# Patient Record
Sex: MALE | Race: WHITE | Employment: FULL TIME | URBAN - METROPOLITAN AREA
[De-identification: names, ages, dates, MRNs, and addresses within clinical notes are randomized per-mention and may not be internally consistent; named-entity substitution may affect disease eponyms.]

---

## 2019-10-07 LAB — HIV AG/AB: NORMAL

## 2021-08-01 ENCOUNTER — HOSPITAL ENCOUNTER (EMERGENCY)
Age: 36
Discharge: HOME | End: 2021-08-01
Attending: FAMILY MEDICINE
Payer: COMMERCIAL

## 2021-08-01 VITALS
WEIGHT: 202 LBS | TEMPERATURE: 98.3 F | SYSTOLIC BLOOD PRESSURE: 128 MMHG | BODY MASS INDEX: 26.77 KG/M2 | DIASTOLIC BLOOD PRESSURE: 80 MMHG | OXYGEN SATURATION: 98 % | HEIGHT: 73 IN | RESPIRATION RATE: 14 BRPM | HEART RATE: 75 BPM

## 2021-08-01 DIAGNOSIS — S46.812A TRAPEZIUS STRAIN, LEFT, INITIAL ENCOUNTER: Primary | ICD-10-CM

## 2021-08-01 PROCEDURE — 99283 EMERGENCY DEPT VISIT LOW MDM: CPT

## 2021-08-01 RX ORDER — IBUPROFEN 400 MG/1
400 TABLET ORAL EVERY 8 HOURS PRN
Qty: 12 TABLET | Refills: 0 | Status: SHIPPED | OUTPATIENT
Start: 2021-08-01

## 2021-08-01 RX ORDER — ACETAMINOPHEN 500 MG
1000 TABLET ORAL EVERY 8 HOURS PRN
Qty: 50 TABLET | Refills: 0 | Status: SHIPPED | OUTPATIENT
Start: 2021-08-01

## 2021-08-01 RX ORDER — CYCLOBENZAPRINE HCL 10 MG
10 TABLET ORAL 3 TIMES DAILY PRN
Qty: 12 TABLET | Refills: 0 | Status: SHIPPED | OUTPATIENT
Start: 2021-08-01 | End: 2021-08-11

## 2021-08-01 ASSESSMENT — PAIN DESCRIPTION - LOCATION: LOCATION: SHOULDER

## 2021-08-01 ASSESSMENT — PAIN DESCRIPTION - PAIN TYPE: TYPE: ACUTE PAIN

## 2021-08-01 ASSESSMENT — PAIN DESCRIPTION - ORIENTATION: ORIENTATION: LEFT

## 2021-08-01 NOTE — ED PROVIDER NOTES
[address] [facility]  Department of Emergency Medicine   ED  Encounter Note  Admit Date/RoomTime: 2021  3:30 PM  ED Room:     NAME: Gela Jurado  : 1985  MRN: [id number]     Chief Complaint:  Shoulder Injury (left shoulder , was digging with a shovel, occured 6 to 8 weeks ago)    History of Present Illness       Libby Flight Rande Peabody is a 30 y.o. old male who presents to the emergency department by private vehicle, for traumatic Left shoulder pain which occured 6 week(s) prior to arrival.  The complaint is due to shoveling. Patient has no prior history of pain/injury with regards to today's visit. He is right handed. The patients tetanus status is within past 5 years. Since onset the symptoms have been persistent. His pain is aggraveated by pressure on or palpation of painful area and relieved by rest of injured area. There is been some numbness down to his fingers no weakness not constant He denies any redness swelling. ROS   Pertinent positives and negatives are stated within HPI, all other systems reviewed and are negative. Past Medical History:  has no past medical history on file. Surgical History:  has a past surgical history that includes other surgical history (2017) and [place] tooth extraction. Social History:  reports that he quit smoking about 4 years ago. He does not have any smokeless tobacco history on file. He reports that he does not use drugs. Family History: family history includes Breast Cancer in his mother. Allergies: Patient has no known allergies. Physical Exam   Oxygen Saturation Interpretation: Normal.        ED Triage Vitals   BP Temp Temp Source Pulse Resp SpO2 Height Weight   21 1541 21 1541 21 1541 21 1541 21 1541 21 1541 21 1533 21 1533   128/80 98.3 °F (36.8 °C) Tympanic 75 14 98 % 6' 1\" (1.854 m) 202 lb (91.6 kg)         Constitutional:  Alert, development consistent with age.   Neck: ACETAMINOPHEN (TYLENOL) 500 MG TABLET    Take 2 tablets by mouth every 8 hours as needed for Pain    CYCLOBENZAPRINE (FLEXERIL) 10 MG TABLET    Take 1 tablet by mouth 3 times daily as needed for Muscle spasms Medication may cause drowsiness do not drive on this medication    IBUPROFEN (IBU) 400 MG TABLET    Take 1 tablet by mouth every 8 hours as needed for Pain Take with full glass of water     Electronically signed by Zechariah Hawley MD   DD: 8/1/21  **This report was transcribed using voice recognition software. Every effort was made to ensure accuracy; however, inadvertent computerized transcription errors may be present.   END OF ED PROVIDER NOTE           Zechariah Hawley MD  08/01/21 [id number]

## 2021-08-19 ENCOUNTER — HOSPITAL ENCOUNTER (OUTPATIENT)
Dept: MRI IMAGING | Age: 36
Discharge: HOME | End: 2021-08-21
Payer: COMMERCIAL

## 2021-08-19 DIAGNOSIS — S43.402A UNSPECIFIED SPRAIN OF LEFT SHOULDER JOINT, INITIAL ENCOUNTER: ICD-10-CM

## 2021-08-19 DIAGNOSIS — M25.512 LEFT SHOULDER PAIN, UNSPECIFIED CHRONICITY: ICD-10-CM

## 2021-08-19 PROCEDURE — 73221 MRI JOINT UPR EXTREM W/O DYE: CPT
